# Patient Record
Sex: MALE | Race: WHITE | NOT HISPANIC OR LATINO | URBAN - METROPOLITAN AREA
[De-identification: names, ages, dates, MRNs, and addresses within clinical notes are randomized per-mention and may not be internally consistent; named-entity substitution may affect disease eponyms.]

---

## 2018-01-30 ENCOUNTER — HOME INFUSION (PRE-WILLOW HOME INFUSION) (OUTPATIENT)
Dept: PHARMACY | Facility: CLINIC | Age: 53
End: 2018-01-30

## 2018-01-31 NOTE — PROGRESS NOTES
This is a recent snapshot of the patient's [facility] medical record.  For current drug dose and complete information and questions, call [phone] or In Basket pool, [facility] home infusion ([id number])  CSN Number:  [id number]

## 2018-07-04 DIAGNOSIS — Z13.6 SCREENING FOR CARDIOVASCULAR CONDITION: Primary | ICD-10-CM

## 2018-07-12 ASSESSMENT — ENCOUNTER SYMPTOMS
MUSCLE WEAKNESS: 0
MUSCLE CRAMPS: 0
BACK PAIN: 0
NECK PAIN: 0
MYALGIAS: 0
STIFFNESS: 0
JOINT SWELLING: 0
ARTHRALGIAS: 1

## 2018-07-13 ENCOUNTER — OFFICE VISIT (OUTPATIENT)
Dept: CARDIOLOGY | Facility: CLINIC | Age: 53
End: 2018-07-13
Payer: COMMERCIAL

## 2018-07-13 VITALS
HEART RATE: 77 BPM | BODY MASS INDEX: 33.72 KG/M2 | DIASTOLIC BLOOD PRESSURE: 86 MMHG | WEIGHT: 249 LBS | SYSTOLIC BLOOD PRESSURE: 131 MMHG | HEIGHT: 72 IN | OXYGEN SATURATION: 96 %

## 2018-07-13 DIAGNOSIS — Z82.49 FAMILY HISTORY OF ISCHEMIC HEART DISEASE: ICD-10-CM

## 2018-07-13 DIAGNOSIS — I10 ESSENTIAL HYPERTENSION: ICD-10-CM

## 2018-07-13 DIAGNOSIS — R73.09 ELEVATED GLUCOSE: Primary | ICD-10-CM

## 2018-07-13 DIAGNOSIS — E78.6 LOW HDL (UNDER 40): ICD-10-CM

## 2018-07-13 DIAGNOSIS — Z13.6 SCREENING FOR CARDIOVASCULAR CONDITION: ICD-10-CM

## 2018-07-13 DIAGNOSIS — M16.0 ARTHRITIS OF BOTH HIPS: ICD-10-CM

## 2018-07-13 DIAGNOSIS — E78.1 HYPERTRIGLYCERIDEMIA: ICD-10-CM

## 2018-07-13 DIAGNOSIS — R73.09 ELEVATED GLUCOSE: ICD-10-CM

## 2018-07-13 LAB
CHOLEST SERPL-MCNC: 169 MG/DL
CREAT UR-MCNC: 168 MG/DL
CRP SERPL HS-MCNC: 6.2 MG/L
FEF 25/75: NORMAL
FEV-1: NORMAL
FEV1/FVC: NORMAL
FVC: NORMAL
GLUCOSE SERPL-MCNC: 129 MG/DL (ref 70–99)
HBA1C MFR BLD: 6.2 % (ref 0–5.6)
HDLC SERPL-MCNC: 25 MG/DL
INTERPRETATION ECG - MUSE: NORMAL
LDLC SERPL CALC-MCNC: 84 MG/DL
MICROALBUMIN UR-MCNC: 5 MG/L
MICROALBUMIN/CREAT UR: 3.01 MG/G CR (ref 0–17)
NONHDLC SERPL-MCNC: 144 MG/DL
NT-PROBNP SERPL-MCNC: 9 PG/ML (ref 0–125)
TRIGL SERPL-MCNC: 304 MG/DL

## 2018-07-13 RX ORDER — LISINOPRIL AND HYDROCHLOROTHIAZIDE 20; 25 MG/1; MG/1
1 TABLET ORAL DAILY
COMMUNITY
Start: 2018-05-29 | End: 2023-12-05

## 2018-07-13 RX ORDER — IBUPROFEN 200 MG
400 TABLET ORAL PRN
COMMUNITY
End: 2023-12-05

## 2018-07-13 RX ORDER — MAGNESIUM CITRATE
1 POWDER (GRAM) MISCELLANEOUS PRN
COMMUNITY
End: 2023-12-05

## 2018-07-13 NOTE — PROGRESS NOTES
" [facility] Cardiovascular Disease Prevention - Exam Note    Active Problems   Patient Active Problem List    Diagnosis Date Noted     Elevated glucose      Priority: Medium     Hypertriglyceridemia      Priority: Medium     Low HDL (under 40)      Priority: Medium     Arthritis of both hips      Priority: Medium     Hypertension 2010     Priority: Medium       Reason For Visit   Patient here for [facility] early detection of atherosclerosis and CVD exam.    Pain Evaluation  Current history of pain associated with this visit is: denied    HPI   Willam Duff is a 52 year old year old male with a history of hypertriglyceridemia, low HDL, hypertension and arthritis of this hips. He had chest pain last May and had an NMPI which was normal. He is concerned about his CV risk since his mother  of an MI abruptly at 62 and his father and brother have coronary disease.  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  His father and brother are also pre-diabetic and he has also had elevated glucose levels.  He has had no recurrent chest pain since May. He does not check his home BP. His left hip was replaced in February and he is planning to have the right one replaced in November.    Nutrition assessment per patient report:   We discussed \"my plate\" and Mediterranean eating patterns and literature was provided. We discussed nutrition for those with metabolic syndrome with more healthy fats, less carbs and partnering cabs with proteins and calorie reduction through smaller portions.  Foods with fat/cholesterol (fried foods, fatty meats, junk food):  0-2 per day   Fruits and vegetables (  cup cooked, 1 cup raw):  2 servings per day  Caffeine (1 cup coffee, soda, etc):  3 servings per day, coffee or energy drink.  Alcohol servings (12 oz. beer, 4 oz. wine, 1  oz. in mixed drink):  2-3 beer per week  Calcium servings (dairy foods, 8 oz. milk, yogurt, cheese, ice cream):  2 servings " per day  Salt/sodium use:  light use  Special dietary habits:  None    Activity  Patient is active 5-7 times per week for 30 or more minutes with walking, yardwork, house chores and walking his dog. Adding upper body strength exercise recommended. He has been limited to more exercise with recent hip replacement in February.    Laboratory Results Review  We discussed laboratory results today including lipids targets and how foods influence cholesterol.    Weight  His perceived healthy weight is 190 pounds.  A normal BMI of 25 is equal to 184 pounds.  The current BMI of 33.84 is high-risk range.  A weight reduction speed of 2-3 lbs per month for men is recommended.    PMH   Past Medical History:   Diagnosis Date     Arthritis of both hips      Diverticulitis      Elevated glucose      Hypertension      Hypertriglyceridemia      Low HDL (under 40)        PSH  Past Surgical History:   Procedure Laterality Date     C TOTAL HIP ARTHROPLASTY Left 2018     COLONOSCOPY  2015     SURGICAL PATHOLOGY EXAM         Current Meds   Current Outpatient Prescriptions   Medication Sig Dispense Refill     lisinopril-hydrochlorothiazide (PRINZIDE/ZESTORETIC) 20-25 MG per tablet Take 1 tablet by mouth daily       ibuprofen (ADVIL/MOTRIN) 200 MG tablet Take 400 mg by mouth as needed       Magnesium Citrate POWD Take 1 teaspoonful by mouth as needed         Allergies    No Known Allergies    Family Hx   Family History   Problem Relation Age of Onset     Coronary Artery Disease Mother 62     MI  first MI     Coronary Artery Disease Father 60     stents     Cerebrovascular Disease Father 70     small strokes. TIA     Pre-Diabetes Brother      Coronary Artery Disease Brother 57     stents     Other - See Comments Maternal Grandmother      unknown cause of death     Other - See Comments Maternal Grandfather      unknown cause of death     Other - See Comments Paternal Grandmother      unknown cause of death     No Known Problems  Brother      Other - See Comments Paternal Grandfather      unknown cause of death       Social History  Willam is  and  and is working full time. His job is semi-active.  He is  with 3 daughters 23, 22 and 18..     Enjoyment of life is 8 with 10 enjoys life fully.    Tobacco History  History   Smoking Status     Not on file   Smokeless Tobacco     Not on file       ROS  CONSTITUTIONAL:  No fever, chills, or sweats. No weight gain/loss.   EENT:  No visual disturbance, ear ache, epistaxis, sore throat  ALLERGIES/IMMUNOLOGIC:  Negative  RESPIRATORY:  No cough, hemoptysis  CARDIOVASCULAR:  As per HPI  GI:  No nausea, vomiting, hematemesis, melena  :  No urinary frequency, dysuria, or hematuria  INTEGUMENT:  Negative  PSYCHIATRIC:  Negative  NEURO:  Negative  ENDOCRINE:  Negative  MUSCULOSKELETAL:  Negative  MUSCULOSKELETAL:  hip pain related to arthritis     Vital Signs   /86  Pulse 77  Ht 1.829 m (6')  Wt 112.9 kg (249 lb)  SpO2 96%  BMI 33.77 kg/m2      Waist: 44 inches  Hip: 41 inches    Physical Exam   In general, the patient is a pleasant male in no apparent distress.    HEENT: NC/AT.  PERRLA.  EOMI.  Sclerae white, not injected.  Nares clear.  Pharynx without erythema or exudate.  Dentition intact.    Neck: No adenopathy.  No thyromegaly.Carotids +4/4 bilaterally without bruits.  No jugular venous distension.   Lungs: CTA.  No ronchi, wheezes, rales.     Cor: RRR. Normal S1, S2 splits physiologically. No murmur, rub, click, or gallop. The PMI is in the 5th ICS in the midclavicular line. There is no heave.   Abdomen: Soft, nontender, nondistended. No organomegaly.  No bruits.   Extremities: No clubbing, cyanosis, or edema. The pulses are +2/2 at the post-tibial sites bilaterally. No bruits are noted.    Recent Labs  Lab Results   Component Value Date     (H) 07/13/2018      Lab Results   Component Value Date    NTBNP 9 07/13/2018     No results found  for: NTBNPI   Lab Results   Component Value Date    UCRR 168 07/13/2018      Lab Results   Component Value Date    MICROL 5 07/13/2018      No results found for: MICROALBUMIN   No results found for: CRP   Lab Results   Component Value Date    CHOL 169 07/13/2018      Lab Results   Component Value Date    TRIG 304 (H) 07/13/2018      Lab Results   Component Value Date    HDL 25 (L) 07/13/2018      Lab Results   Component Value Date    LDL 84 07/13/2018      No results found for: VLDL   No results found for: CHOLHDLRATIO  Lab Results   Component Value Date    NHDL 144 (H) 07/13/2018          Ang Test Results    BASIC SPIROMETRY: Summary of two attempts (see printout for details of results)  Results Estimated range for ht/age   FVC: 4.79 liter FVC: 4.33-6.31 liter   FEV1: 4.05 liter FEV1: 3.26-4.93 liter     History of asthma:  NO   History of respiratory infection current/recent:  NO     Spirometry Results:  normal      WALKING BLOOD PRESSURE RESPONSE (3 minute, 5 MET level walk)   Pre BP: 140/80 mmHg  3 min BP: 176/60 mmHg  1 min post BP: 132/70 mmHg    Pre HR: 83 bpm  3 min HR: 115 bpm  1 min post HR: 77 bpm       RETINAL VASCULAR ASSESSMENT   Left Eye Abnormality:  none  AV Ratio: 0.8    Right Eye Abnormality:  none  AV Ratio: 0.8     Retinal Assessment:  normal      ABDOMINAL AORTA ULTRASOUND (< 2.5 normal, borderline 2.5-2.9, abnormal > 3)   SupraIliac 1.96 cm    SupraRenal 1.84 cm    InfraRenal Proximal 1.89 cm    InfraRenal Distal 2.17 cm      Abdominal Aorta Assessment:  normal      LEFT VENTRICULAR ULTRASOUND MEASUREMENTS (adjusted for BSA)  LVIDD 51.2 mm   Septa 10.4 mm   Posterior 9.3 mm     Left Ventricular US Assessment:  normal      Carotid Artery IMT measurements report and plaques in the small area examined:   Left IMT 0.724 mm  Plaques none    Right IMT 0.641 mm  Plaques none       ECG (see tracing):  normal sinus rhythm;  rate: 70 bpm      Arterial Elasticity per age and gender (see printout):    C1 13.6 mL/mmHg x 10  normal   C2 11.8 mL/mmHg x 100 normal   Supine blood pressure: 153/86 mmHg         [facility]    [address], [address], [place] [address]    Main: [phone]  [email or web address]                                                        Myocardial Perfusion Report                      Rest/Stress 1 Day Single Isotope Gated SPECT imaging with Sesar exercise     stress            DILLON BHAKTA ID: [id number] Age: 52 : 1965 Nuclear Tech: CPT    Exam Date: 05/10/2018 12:29 Gender: M RN/Ex. Physiologist: ZAIN/SALINAS    Accession #: [id number] Height: 72 in BSA: 2.3 m  Monitoring Provider: MARBIN SANCHEZ    Weight: 240 lbs BMI: 32.5 kg/m  Ordering Provider: JANIS STORM    Site: [facility]    Location: Outpatient    Indication: Chest pain, unspecified type        FINAL CONCLUSIONS    NORMAL exercise stress perfusion imaging study.    The stress and rest perfusion images are NORMAL.    Normal left ventricular size and systolic function with a calculated LVEF of 76%.    Normal left ventricular wall motion.    No diagnostic ECG evidence of ischemia.    Excellent functional METs.  Answers for HPI/ROS submitted by the patient on 2018   General Symptoms: No  Skin Symptoms: No  HENT Symptoms: No  EYE SYMPTOMS: No  HEART SYMPTOMS: No  LUNG SYMPTOMS: No  INTESTINAL SYMPTOMS: No  URINARY SYMPTOMS: No  REPRODUCTIVE SYMPTOMS: No  SKELETAL SYMPTOMS: Yes  BLOOD SYMPTOMS: No  NERVOUS SYSTEM SYMPTOMS: No  MENTAL HEALTH SYMPTOMS: No  Back pain: No  Muscle aches: No  Neck pain: No  Swollen joints: No  Joint pain: Yes  Bone pain: No  Muscle cramps: No  Muscle weakness: No  Joint stiffness: No  Bone fracture: No    Assessment:     Cardiovascular:  ECG sinus rhythm rate 73. Nt pro BNP is normal. No recurrent chest pain and NMPI was normal . He does have family hx of CAD with mother, father and brother 50-60's. We  discussed also obtaining a cac scan to see if statin therapy is needed with LDL at target but HDL low.    Blood Pressure:  Taking Lisinopril HC 20-25mg per day. 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 Resting blood pressures are hypertensive stage 1-2 today. Will consider changing HCTZ to amlodipine to reduce risk of diabetes, gout and frequency of urination. Creatinine elevated off and on up to 1.42 in 2015 most recent 1.22 5-2018. GFR as low as 53 but most often 58-60. He uses IBF with hip surgeries. Recommend that he is aware of good hydration and avoid taking more IBF than he needs.    Lipids: He is not taking any lipid medications. LDL has not been significantly elevated in the past and is currently at 84. His HDL is low at 25 and this is consistent with previous levels. Triglycerides have been in the 300's. Will initiate fish oil one tablet per day for triglyceride reduction. Depending on results he may need a statin if significant disease identified. Statin may increase his glucose further. Will obtain base line A1c to see if he meets criteria for diabetes. Metformin may be recommended depending on results.    Health Habit Summary:  Nutrition: Heart Healthy Eating:  some of the time   Exercise:  regularly active  Weight:  high-risk range  Tobacco Use:  never used    Full report to follow prevention team review of test results with scanned final report.    Time spent for patient visit was 60 minutes with more than half the time spent on counseling and coordination of care.    LADONNA Lee CNP       CC  Patient Care Team:  Look-Coty Livingston MD as PCP - General  SELF, REFERRED

## 2018-07-13 NOTE — LETTER
"2018      RE: Willam Duff  [address] [facility] [address]       Dear Colleague,    Thank you for the opportunity to participate in the care of your patient, Willam Duff, at the [facility] PREVENT[facility] at [facility]. Please see a copy of my visit note below.     [facility] Cardiovascular Disease Prevention - Exam Note    Active Problems   Patient Active Problem List    Diagnosis Date Noted     Elevated glucose      Priority: Medium     Hypertriglyceridemia      Priority: Medium     Low HDL (under 40)      Priority: Medium     Arthritis of both hips      Priority: Medium     Hypertension 2010     Priority: Medium       Reason For Visit   Patient here for [facility] early detection of atherosclerosis and CVD exam.    Pain Evaluation  Current history of pain associated with this visit is: denied    HPI   Willam Duff is a 52 year old year old male with a history of hypertriglyceridemia, low HDL, hypertension and arthritis of this hips. He had chest pain last May and had an NMPI which was normal. He is concerned about his CV risk since his mother  of an MI abruptly at 62 and his father and brother have coronary disease.  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  His father and brother are also pre-diabetic and he has also had elevated glucose levels.  He has had no recurrent chest pain since May. He does not check his home BP. His left hip was replaced in February and he is planning to have the right one replaced in November.    Nutrition assessment per patient report:   We discussed \"my plate\" and Mediterranean eating patterns and literature was provided. We discussed nutrition for those with metabolic syndrome with more healthy fats, less carbs and partnering cabs with proteins and calorie reduction through smaller portions.  Foods with fat/cholesterol (fried foods, " fatty meats, junk food):  0-2 per day   Fruits and vegetables (  cup cooked, 1 cup raw):  2 servings per day  Caffeine (1 cup coffee, soda, etc):  3 servings per day, coffee or energy drink.  Alcohol servings (12 oz. beer, 4 oz. wine, 1  oz. in mixed drink):  2-3 beer per week  Calcium servings (dairy foods, 8 oz. milk, yogurt, cheese, ice cream):  2 servings per day  Salt/sodium use:  light use  Special dietary habits:  None    Activity  Patient is active 5-7 times per week for 30 or more minutes with walking, yardwork, house chores and walking his dog. Adding upper body strength exercise recommended. He has been limited to more exercise with recent hip replacement in February.    Laboratory Results Review  We discussed laboratory results today including lipids targets and how foods influence cholesterol.    Weight  His perceived healthy weight is 190 pounds.  A normal BMI of 25 is equal to 184 pounds.  The current BMI of 33.84 is high-risk range.  A weight reduction speed of 2-3 lbs per month for men is recommended.    PMH   Past Medical History:   Diagnosis Date     Arthritis of both hips      Diverticulitis      Elevated glucose      Hypertension 2010     Hypertriglyceridemia      Low HDL (under 40)        PSH  Past Surgical History:   Procedure Laterality Date     C TOTAL HIP ARTHROPLASTY Left 2018     COLONOSCOPY  2015     SURGICAL PATHOLOGY EXAM         Current Meds   Current Outpatient Prescriptions   Medication Sig Dispense Refill     lisinopril-hydrochlorothiazide (PRINZIDE/ZESTORETIC) 20-25 MG per tablet Take 1 tablet by mouth daily       ibuprofen (ADVIL/MOTRIN) 200 MG tablet Take 400 mg by mouth as needed       Magnesium Citrate POWD Take 1 teaspoonful by mouth as needed         Allergies    No Known Allergies    Family Hx   Family History   Problem Relation Age of Onset     Coronary Artery Disease Mother 62     MI  first MI     Coronary Artery Disease Father 60     stents     Cerebrovascular  Disease Father 70     small strokes. TIA     Pre-Diabetes Brother      Coronary Artery Disease Brother 57     stents     Other - See Comments Maternal Grandmother      unknown cause of death     Other - See Comments Maternal Grandfather      unknown cause of death     Other - See Comments Paternal Grandmother      unknown cause of death     No Known Problems Brother      Other - See Comments Paternal Grandfather      unknown cause of death       Social History  Willam is  and  and is working full time. His job is semi-active.  He is  with 3 daughters 23, 22 and 18..     Enjoyment of life is 8 with 10 enjoys life fully.    Tobacco History  History   Smoking Status     Not on file   Smokeless Tobacco     Not on file       ROS  CONSTITUTIONAL:  No fever, chills, or sweats. No weight gain/loss.   EENT:  No visual disturbance, ear ache, epistaxis, sore throat  ALLERGIES/IMMUNOLOGIC:  Negative  RESPIRATORY:  No cough, hemoptysis  CARDIOVASCULAR:  As per HPI  GI:  No nausea, vomiting, hematemesis, melena  :  No urinary frequency, dysuria, or hematuria  INTEGUMENT:  Negative  PSYCHIATRIC:  Negative  NEURO:  Negative  ENDOCRINE:  Negative  MUSCULOSKELETAL:  Negative  MUSCULOSKELETAL:  hip pain related to arthritis     Vital Signs   /86  Pulse 77  Ht 1.829 m (6')  Wt 112.9 kg (249 lb)  SpO2 96%  BMI 33.77 kg/m2      Waist: 44 inches  Hip: 41 inches    Physical Exam   In general, the patient is a pleasant male in no apparent distress.    HEENT: NC/AT.  PERRLA.  EOMI.  Sclerae white, not injected.  Nares clear.  Pharynx without erythema or exudate.  Dentition intact.    Neck: No adenopathy.  No thyromegaly.Carotids +4/4 bilaterally without bruits.  No jugular venous distension.   Lungs: CTA.  No ronchi, wheezes, rales.     Cor: RRR. Normal S1, S2 splits physiologically. No murmur, rub, click, or gallop. The PMI is in the 5th ICS in the midclavicular line. There is no  chris.   Abdomen: Soft, nontender, nondistended. No organomegaly.  No bruits.   Extremities: No clubbing, cyanosis, or edema. The pulses are +2/2 at the post-tibial sites bilaterally. No bruits are noted.    Recent Labs  Lab Results   Component Value Date     (H) 07/13/2018      Lab Results   Component Value Date    NTBNP 9 07/13/2018     No results found for: NTBNPI   Lab Results   Component Value Date    UCRR 168 07/13/2018      Lab Results   Component Value Date    MICROL 5 07/13/2018      No results found for: MICROALBUMIN   No results found for: CRP   Lab Results   Component Value Date    CHOL 169 07/13/2018      Lab Results   Component Value Date    TRIG 304 (H) 07/13/2018      Lab Results   Component Value Date    HDL 25 (L) 07/13/2018      Lab Results   Component Value Date    LDL 84 07/13/2018      No results found for: VLDL   No results found for: CHOLHDLRATIO  Lab Results   Component Value Date    NHDL 144 (H) 07/13/2018          Ang Test Results    BASIC SPIROMETRY: Summary of two attempts (see printout for details of results)  Results Estimated range for ht/age   FVC: 4.79 liter FVC: 4.33-6.31 liter   FEV1: 4.05 liter FEV1: 3.26-4.93 liter     History of asthma:  NO   History of respiratory infection current/recent:  NO     Spirometry Results:  normal      WALKING BLOOD PRESSURE RESPONSE (3 minute, 5 MET level walk)   Pre BP: 140/80 mmHg  3 min BP: 176/60 mmHg  1 min post BP: 132/70 mmHg    Pre HR: 83 bpm  3 min HR: 115 bpm  1 min post HR: 77 bpm       RETINAL VASCULAR ASSESSMENT   Left Eye Abnormality:  none  AV Ratio: 0.8    Right Eye Abnormality:  none  AV Ratio: 0.8     Retinal Assessment:  normal      ABDOMINAL AORTA ULTRASOUND (< 2.5 normal, borderline 2.5-2.9, abnormal > 3)   SupraIliac 1.96 cm    SupraRenal 1.84 cm    InfraRenal Proximal 1.89 cm    InfraRenal Distal 2.17 cm      Abdominal Aorta Assessment:  normal      LEFT VENTRICULAR ULTRASOUND MEASUREMENTS (adjusted for  BSA)  LVIDD 51.2 mm   Septa 10.4 mm   Posterior 9.3 mm     Left Ventricular US Assessment:  normal      Carotid Artery IMT measurements report and plaques in the small area examined:   Left IMT 0.724 mm  Plaques none    Right IMT 0.641 mm  Plaques none       ECG (see tracing):  normal sinus rhythm;  rate: 70 bpm      Arterial Elasticity per age and gender (see printout):   C1 13.6 mL/mmHg x 10  normal   C2 11.8 mL/mmHg x 100 normal   Supine blood pressure: 153/86 mmHg         [facility]    [address], [address], [place] [address]    Main: [phone]  [email or web address]                                                        Myocardial Perfusion Report                      Rest/Stress 1 Day Single Isotope Gated SPECT imaging with Sesar exercise     stress            DILLON BHAKTA ID: [id number] Age: 52 : 1965 Nuclear Tech: CPT    Exam Date: 05/10/2018 12:29 Gender: M RN/Ex. Physiologist: ZAIN/SALINAS    Accession #: [id number] Height: 72 in BSA: 2.3 m  Monitoring Provider: MARBIN SANCHEZ    Weight: 240 lbs BMI: 32.5 kg/m  Ordering Provider: JANIS STORM    Site: [facility]    Location: Outpatient    Indication: Chest pain, unspecified type        FINAL CONCLUSIONS    NORMAL exercise stress perfusion imaging study.    The stress and rest perfusion images are NORMAL.    Normal left ventricular size and systolic function with a calculated LVEF of 76%.    Normal left ventricular wall motion.    No diagnostic ECG evidence of ischemia.    Excellent functional METs.  Answers for HPI/ROS submitted by the patient on 2018   General Symptoms: No  Skin Symptoms: No  HENT Symptoms: No  EYE SYMPTOMS: No  HEART SYMPTOMS: No  LUNG SYMPTOMS: No  INTESTINAL SYMPTOMS: No  URINARY SYMPTOMS: No  REPRODUCTIVE SYMPTOMS: No  SKELETAL SYMPTOMS: Yes  BLOOD SYMPTOMS: No  NERVOUS SYSTEM SYMPTOMS: No  MENTAL HEALTH SYMPTOMS: No  Back pain: No  Muscle  aches: No  Neck pain: No  Swollen joints: No  Joint pain: Yes  Bone pain: No  Muscle cramps: No  Muscle weakness: No  Joint stiffness: No  Bone fracture: No    Assessment:     Cardiovascular:  ECG sinus rhythm rate 73. Nt pro BNP is normal. No recurrent chest pain and NMPI was normal 5-2018. He does have family hx of CAD with mother, father and brother 50-60's. We discussed also obtaining a cac scan to see if statin therapy is needed with LDL at target but HDL low.    Blood Pressure:  Taking Lisinopril HC 20-25mg per day. 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 Resting blood pressures are hypertensive stage 1-2 today. Will consider changing HCTZ to amlodipine to reduce risk of diabetes, gout and frequency of urination. Creatinine elevated off and on up to 1.42 in 2015 most recent 1.22 5-2018. GFR as low as 53 but most often 58-60. He uses IBF with hip surgeries. Recommend that he is aware of good hydration and avoid taking more IBF than he needs.    Lipids: He is not taking any lipid medications. LDL has not been significantly elevated in the past and is currently at 84. His HDL is low at 25 and this is consistent with previous levels. Triglycerides have been in the 300's. Will initiate fish oil one tablet per day for triglyceride reduction. Depending on results he may need a statin if significant disease identified. Statin may increase his glucose further. Will obtain base line A1c to see if he meets criteria for diabetes. Metformin may be recommended depending on results.    Health Habit Summary:  Nutrition: Heart Healthy Eating:  some of the time   Exercise:  regularly active  Weight:  high-risk range  Tobacco Use:  never used    Full report to follow prevention team review of test results with scanned final report.    Time spent for patient visit was 60 minutes with more than half the time spent on counseling and coordination of care.    Daniela Quinonez  LADONNA Mendez CNP       CC  Patient Care Team:  Look-Coty Livingston MD as PCP - General  SELF, REFERRED    Please do not hesitate to contact me if you have any questions/concerns.     Sincerely,     LADONNA Lee CNP

## 2018-07-19 ENCOUNTER — TELEPHONE (OUTPATIENT)
Dept: CARDIOLOGY | Facility: CLINIC | Age: 53
End: 2018-07-19

## 2018-07-19 RX ORDER — AMLODIPINE BESYLATE 5 MG/1
5 TABLET ORAL DAILY
Qty: 90 TABLET | Refills: 0 | Status: SHIPPED | OUTPATIENT
Start: 2018-07-19 | End: 2018-10-16

## 2018-07-19 NOTE — TELEPHONE ENCOUNTER
Will add amlodipine 5 mg pre day to optimizes his blood pressure. He will check home BP's and call me in a few weeks with results. Will recheck glucose and A1c in 3 months. He is having CAC scan next week with strong family hx of early CAD. He has purchased fish oil and will start taking one per day.

## 2018-07-23 ENCOUNTER — HOSPITAL ENCOUNTER (OUTPATIENT)
Dept: CT IMAGING | Facility: CLINIC | Age: 53
Discharge: HOME | End: 2018-07-23
Attending: NURSE PRACTITIONER | Admitting: NURSE PRACTITIONER
Payer: COMMERCIAL

## 2018-07-23 DIAGNOSIS — I10 ESSENTIAL HYPERTENSION: ICD-10-CM

## 2018-07-23 DIAGNOSIS — R73.09 ELEVATED GLUCOSE: ICD-10-CM

## 2018-07-23 DIAGNOSIS — E78.1 HYPERTRIGLYCERIDEMIA: ICD-10-CM

## 2018-07-23 DIAGNOSIS — Z82.49 FAMILY HISTORY OF ISCHEMIC HEART DISEASE: ICD-10-CM

## 2018-07-23 DIAGNOSIS — M16.0 ARTHRITIS OF BOTH HIPS: ICD-10-CM

## 2018-07-23 DIAGNOSIS — E78.6 LOW HDL (UNDER 40): ICD-10-CM

## 2018-07-23 PROCEDURE — 75571 CT HRT W/O DYE W/CA TEST: CPT

## 2018-07-23 PROCEDURE — 75571 CT HRT W/O DYE W/CA TEST: CPT | Mod: 26 | Performed by: INTERNAL MEDICINE

## 2018-10-16 DIAGNOSIS — Z82.49 FAMILY HISTORY OF ISCHEMIC HEART DISEASE: ICD-10-CM

## 2018-10-16 DIAGNOSIS — R73.09 ELEVATED GLUCOSE: ICD-10-CM

## 2018-10-16 DIAGNOSIS — E78.1 HYPERTRIGLYCERIDEMIA: ICD-10-CM

## 2018-10-16 DIAGNOSIS — M16.0 ARTHRITIS OF BOTH HIPS: ICD-10-CM

## 2018-10-16 DIAGNOSIS — I10 ESSENTIAL HYPERTENSION: ICD-10-CM

## 2018-10-16 DIAGNOSIS — E78.6 LOW HDL (UNDER 40): ICD-10-CM

## 2018-10-16 RX ORDER — AMLODIPINE BESYLATE 5 MG/1
5 TABLET ORAL DAILY
Qty: 90 TABLET | Refills: 0 | Status: SHIPPED | OUTPATIENT
Start: 2018-10-16 | End: 2019-01-22

## 2018-10-16 RX ORDER — AMLODIPINE BESYLATE 5 MG/1
5 TABLET ORAL DAILY
Qty: 90 TABLET | Refills: 0 | Status: CANCELLED | OUTPATIENT
Start: 2018-10-16

## 2019-01-15 DIAGNOSIS — R73.09 ELEVATED GLUCOSE: ICD-10-CM

## 2019-01-15 DIAGNOSIS — Z82.49 FAMILY HISTORY OF ISCHEMIC HEART DISEASE: ICD-10-CM

## 2019-01-15 DIAGNOSIS — E78.6 LOW HDL (UNDER 40): ICD-10-CM

## 2019-01-15 DIAGNOSIS — M16.0 ARTHRITIS OF BOTH HIPS: ICD-10-CM

## 2019-01-15 DIAGNOSIS — E78.1 HYPERTRIGLYCERIDEMIA: ICD-10-CM

## 2019-01-15 DIAGNOSIS — I10 ESSENTIAL HYPERTENSION: ICD-10-CM

## 2019-01-22 ENCOUNTER — TELEPHONE (OUTPATIENT)
Dept: CARDIOLOGY | Facility: CLINIC | Age: 54
End: 2019-01-22

## 2019-01-22 DIAGNOSIS — Z82.49 FAMILY HISTORY OF ISCHEMIC HEART DISEASE: ICD-10-CM

## 2019-01-22 DIAGNOSIS — E78.1 HYPERTRIGLYCERIDEMIA: ICD-10-CM

## 2019-01-22 DIAGNOSIS — R73.09 ELEVATED GLUCOSE: ICD-10-CM

## 2019-01-22 DIAGNOSIS — M16.0 ARTHRITIS OF BOTH HIPS: ICD-10-CM

## 2019-01-22 DIAGNOSIS — I10 ESSENTIAL HYPERTENSION: ICD-10-CM

## 2019-01-22 DIAGNOSIS — E78.6 LOW HDL (UNDER 40): ICD-10-CM

## 2019-01-22 RX ORDER — AMLODIPINE BESYLATE 5 MG/1
5 TABLET ORAL DAILY
Qty: 90 TABLET | Refills: 0 | Status: SHIPPED | OUTPATIENT
Start: 2019-01-22 | End: 2023-12-05

## 2019-01-22 RX ORDER — AMLODIPINE BESYLATE 5 MG/1
5 TABLET ORAL DAILY
Qty: 90 TABLET | Refills: 0 | Status: CANCELLED | OUTPATIENT
Start: 2019-01-22

## 2020-02-24 ENCOUNTER — HEALTH MAINTENANCE LETTER (OUTPATIENT)
Age: 55
End: 2020-02-24

## 2020-12-13 ENCOUNTER — HEALTH MAINTENANCE LETTER (OUTPATIENT)
Age: 55
End: 2020-12-13

## 2021-04-17 ENCOUNTER — HEALTH MAINTENANCE LETTER (OUTPATIENT)
Age: 56
End: 2021-04-17

## 2021-09-26 ENCOUNTER — HEALTH MAINTENANCE LETTER (OUTPATIENT)
Age: 56
End: 2021-09-26

## 2022-05-08 ENCOUNTER — HEALTH MAINTENANCE LETTER (OUTPATIENT)
Age: 57
End: 2022-05-08

## 2023-04-23 ENCOUNTER — HEALTH MAINTENANCE LETTER (OUTPATIENT)
Age: 58
End: 2023-04-23

## 2023-06-02 ENCOUNTER — HEALTH MAINTENANCE LETTER (OUTPATIENT)
Age: 58
End: 2023-06-02

## 2023-11-14 ENCOUNTER — TRANSFERRED RECORDS (OUTPATIENT)
Dept: HEALTH INFORMATION MANAGEMENT | Facility: CLINIC | Age: 58
End: 2023-11-14
Payer: COMMERCIAL

## 2023-11-28 ENCOUNTER — TRANSCRIBE ORDERS (OUTPATIENT)
Dept: OTHER | Age: 58
End: 2023-11-28

## 2023-11-28 DIAGNOSIS — H90.5 SENSORINEURAL HEARING LOSS (SNHL) OF LEFT EAR: Primary | ICD-10-CM

## 2023-11-29 DIAGNOSIS — H90.5 SNHL (SENSORINEURAL HEARING LOSS): Primary | ICD-10-CM

## 2023-11-29 DIAGNOSIS — I65.1 VERTEBROBASILAR DOLICHOECTASIA: ICD-10-CM

## 2023-11-30 NOTE — TELEPHONE ENCOUNTER
FUTURE VISIT INFORMATION      FUTURE VISIT INFORMATION:  Date: 12/5/23  Time: 2:30 PM  Location: [facility]  REFERRAL INFORMATION:  Referring provider:  Mirlande SURESH   Referring providers clinic:  [facility]   Reason for visit/diagnosis:  L Sensorineural hearing loss with MRI showing dolichoectasia of R vertebral artery in contact with the L 7th and 8th CNs - Referred by Mirlande SURESH. MRI done 11/29 at [facility]     RECORDS REQUESTED FROM      Clinic name Comments Records Status Imaging Status   [facility]  11/21/23 OV with Mirlande SURESH   11/14/23 OV with Lexy Euceda CCC-A      11/14/23 audiogram -- received, send to scan CE    PN imaging MR brain 11/27/23 CE Req 11/30/23  PACS           November 30, 2023 7:44 AM - Image and audiogram requested -[facility]  December 1, 2023 7:28 AM - Received 11/14/23 audiogram and send to scanning. Images resolved in PACS -[facility]

## 2023-12-03 NOTE — PROGRESS NOTES
[facility]  Department of Neurosurgery    Name: Willam Duff  MRN: [id number]  : 1965     Reason for visit: hearing loss and dolichoectactic vertebrobasilar ectasia, new patient visit    Dear Dr. Sorensen and Ms. Schmid,    It was a pleasure to see Mr. Wright in the [facility] today.  I have reviewed the referral notes and imaging and have summarized our visit here. As you recall, Mr. Wright is a pleasant 57 year-old right-handed male who presents with left sided hearing loss and tinnitus. After workup by Ms. Rogersjose m, an audiogram was performed showing asymmetric hearing loss and then an MRI was performed showing an ectatic vertebrobasilar system with compression of the cranial nerve 7/8 complex. He is referred for management.    Review of Systems:   Pertinent items are noted in HPI or as in patient entered ROS below, remainder of complete ROS is negative.     Medications: amlodipine, ibuprofen, lisinopril, hydrochlorothiazide, magnesium     Allergies: Patient has no known allergies.      Past Medical History:  arthritis, diverticulitis, hypertension, elevated cholesterol, hip arthroplasty    Family History:  mother and father side with coronary artery disease     Social History: lifetime nonsmoker. He is a  with noise exposure.    Physical Exam:   General: No acute distress.   Head: No signs of trauma.    Eyes: Conjunctivae are normal.  Mouth/Throat: Oropharynx moist.  Neck: Normal range of motion.    Resp: No respiratory distress.   MSK: Moves all extremities.  No obvious deformity.  Neuro: The patient is fully oriented and quite pleasant. Speech normal. Extraocular movements are intact without nystagmus. Facial sensation is intact in V1, V2, V3 distributions. Facial nerve function is normal, rated as a House Brackmann 1, without synkinesis.  Palate is symmetric. Shoulders are full strength.  "Tongue is midline. There is no pronator drift. Full strength in all extremities. Sensation intact throughout. No dysmetria with finger-nose-finger testing.   Psych: Normal mood and affect. Behavior is normal.      Audiogram:  We reviewed the audiogram today which shows:  Right PTA 8 dB,  % at 50 dB  Left PTA 29 dB, WRS 92 % at 80 dB    Audiogram 12/1/2023 at [facility] shows a downsloping hearing loss on the left.   Right PTA 7dB, % at 50dB  Left PTA 23dB, WRS 80% at 65dB    Imaging:  We reviewed the MRI from 11/27/2023 that shows a dolicoectactic vertebrobasilar system. There is a large right and diminutive left vetebral artery. The large right vertebral artery courses contralaterally and produces mass effect on the cranial CN7/8 complex and brainstem. It is unclear if the vertebral arteries join or maintain distinct systems. I do not see any enhancing pathology in the IAC or CPA.     Assessment:  Left hearing loss, tinnitus, class A  2.   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 Vertebrobasilar compression as a cause of ipsilateral hearing loss and vestibular dysfunction is a rarer but reported symptom due to mass effect on the 7/8 complex than the more common hemifacial spasm. We also consider this a \"a diagnosis of exclusion\" with an importance of ruling out other causes of asymmetric hearing loss. In his case, it is also possible that noise exposure from his occupation/firearms may be the cause and it is difficult to know for sure if the vessel compression is the definitive cause.  Vessel ectasia is often linked with hypertension, smoking and uncommon vertebral-basilar anatomy.  We advised good blood pressure control as an attempt to further worsen the ectasia and effect on the CN 7/8 complex.   We advised good blood pressure control, not smoking (he is a nonsmoker), and cholesterol control, " diet and exercise optimization. He is working with his PCP on this  We will plan to see him as needed and we encouraged him to call us with any questions moving forward.      It was a pleasure to participate in the care of your patient. Please feel free to contact me at any point if I can be of any assistance for Mr. Duff.    Sincerely,  Seferino Lewis MD       30 minutes spent on the date of the encounter doing chart review, review of outside records, review of test results, interpretation of tests, patient visit, documentation and discussion with other provider(s)

## 2023-12-05 ENCOUNTER — PRE VISIT (OUTPATIENT)
Dept: OTOLARYNGOLOGY | Facility: CLINIC | Age: 58
End: 2023-12-05

## 2023-12-05 ENCOUNTER — OFFICE VISIT (OUTPATIENT)
Dept: OTOLARYNGOLOGY | Facility: CLINIC | Age: 58
End: 2023-12-05
Payer: COMMERCIAL

## 2023-12-05 ENCOUNTER — OFFICE VISIT (OUTPATIENT)
Dept: AUDIOLOGY | Facility: CLINIC | Age: 58
End: 2023-12-05
Attending: OTOLARYNGOLOGY
Payer: COMMERCIAL

## 2023-12-05 DIAGNOSIS — I65.1 VERTEBROBASILAR DOLICHOECTASIA: Primary | ICD-10-CM

## 2023-12-05 DIAGNOSIS — I65.1 VERTEBROBASILAR DOLICHOECTASIA: ICD-10-CM

## 2023-12-05 DIAGNOSIS — H90.3 ASYMMETRICAL SENSORINEURAL HEARING LOSS: Primary | ICD-10-CM

## 2023-12-05 DIAGNOSIS — H90.3 ASYMMETRIC SNHL (SENSORINEURAL HEARING LOSS): Primary | ICD-10-CM

## 2023-12-05 DIAGNOSIS — H90.42 SENSORINEURAL HEARING LOSS (SNHL) OF LEFT EAR WITH UNRESTRICTED HEARING OF RIGHT EAR: ICD-10-CM

## 2023-12-05 DIAGNOSIS — H90.5 SNHL (SENSORINEURAL HEARING LOSS): ICD-10-CM

## 2023-12-05 DIAGNOSIS — I77.89 ECTASIA OF ARTERY (H): ICD-10-CM

## 2023-12-05 PROCEDURE — 92557 COMPREHENSIVE HEARING TEST: CPT | Mod: 52 | Performed by: AUDIOLOGIST

## 2023-12-05 PROCEDURE — 99203 OFFICE O/P NEW LOW 30 MIN: CPT | Performed by: NEUROLOGICAL SURGERY

## 2023-12-05 PROCEDURE — 92565 STENGER TEST PURE TONE: CPT | Performed by: AUDIOLOGIST

## 2023-12-05 PROCEDURE — 99203 OFFICE O/P NEW LOW 30 MIN: CPT | Mod: GC | Performed by: OTOLARYNGOLOGY

## 2023-12-05 PROCEDURE — 92550 TYMPANOMETRY & REFLEX THRESH: CPT | Performed by: AUDIOLOGIST

## 2023-12-05 RX ORDER — LISINOPRIL 40 MG/1
1 TABLET ORAL DAILY
COMMUNITY
Start: 2023-11-03

## 2023-12-05 RX ORDER — AMLODIPINE BESYLATE 10 MG/1
TABLET ORAL
COMMUNITY

## 2023-12-05 ASSESSMENT — PAIN SCALES - GENERAL: PAINLEVEL: NO PAIN (0)

## 2023-12-05 NOTE — NURSING NOTE
Chief Complaint   Patient presents with    Consult     Sensorineural hearing loss      Miller Ruiz LPN

## 2023-12-05 NOTE — PROGRESS NOTES
[facility]      Name: Willam Duff  MRN: [id number]  Age: 57 year old  : 1965  Referring provider:   Mirlande SURESH   2023     Chief Complaint:   Consultation    History of Present Illness:   Willam Duff is a 57 year old male with a history of asymmetrical hearing loss, left-sided SNHL who was seen in the [facility] for consultation requested by Mirlande SURESH regarding dolichoectasia of the right vertebral artery.  He is seen in conjunction with my colleague in neurosurgery, Dr. Seferino Lewis.    Mr. Duff was found to have left sensorineural hearing loss. MRI was done showing dolichoectasia of the right vertebral artery which is potentially in contact with left CNVII and CNVIII.     He reports changes in hearing over the past 2 years and constant tinnitus in the left ear. Tinnitus onset over 5 yeasrs ago.  The tinnitus is fluctuating and high-pitched in character.  It is not pulsatile.  Of note, he works as a  and has a history of noise exposure due to firearms.     He has noted no vertigo, imbalance, or asymmetry of facial movements.    Review of Systems:   Pertinent items are noted in HPI or as in patient entered ROS below, remainder of complete ROS is negative.       2023     5:38 PM    ENT ROS   Ears, Nose, Throat Hearing loss    Ringing/noise in ears        Active Medications:     Current Outpatient Medications:     amLODIPine (NORVASC) 10 MG tablet, , Disp: , Rfl:     lisinopril (ZESTRIL) 40 MG tablet, Take 1 tablet by mouth daily, Disp: , Rfl:       Allergies:   Hydrochlorothiazide      Past Medical History:  Past Medical History:   Diagnosis Date    Arthritis of both hips     Diverticulitis 2015    Elevated glucose     Hypertension 2010    Hypertriglyceridemia     Low HDL (under 40)     Normal cardiac stress test 2018    NMPI     Patient Active Problem List   Diagnosis     Elevated glucose    Hypertension    Hypertriglyceridemia    Low HDL (under 40)    Arthritis of both hips        Past Surgical History:  Past Surgical History:   Procedure Laterality Date    COLONOSCOPY      SURGICAL PATHOLOGY EXAM      ZZC TOTAL HIP ARTHROPLASTY Left 2018       Family History:   Family History   Problem Relation Age of Onset    Coronary Artery Disease Mother 62        MI  first MI    Coronary Artery Disease Father 60        stents    Cerebrovascular Disease Father 70        small strokes. TIA    Gout Father     Pre-Diabetes Brother     Coronary Artery Disease Brother 57        stents    Other - See Comments Maternal Grandmother         unknown cause of death    Other - See Comments Maternal Grandfather         unknown cause of death    Other - See Comments Paternal Grandmother         unknown cause of death    No Known Problems Brother     Other - See Comments Paternal Grandfather         unknown cause of death         Social History:   Social History     Tobacco Use    Smoking status: Never        Physical Exam:   Constitutional:  The patient was accompanied by his wife Raissa, well-groomed, and in no acute distress.     Skin: Normal:  warm and pink without rash   Neurologic: Alert and oriented x 3.  CN's III-XII within normal limits.  Voice normal.    Psychiatric: The patient's affect was calm, cooperative, and appropriate.     Communication:  Normal; communicates verbally, normal voice quality.   Respiratory: Breathing comfortably without stridor or exertion of accessory muscles.    Head/Face:  No lesions or scars. No sinus tenderness.    Salivary glands -  Normal size, no tenderness, swelling, or palpable masses   Eyes: Pupils were equal and reactive.  Extraocular movement intact.     Ears: Pinnae and tragus non-tender.    EAC's and TM's were clear.            Audiogram:  AUDIOGRAM: He underwent an audiogram 23. This demonstrated:      Right: Speech reception threshold is 15  dB with 100% word recognition.   Left: Speech reception threshold is 25 dB with 80% word recognition.    Audiogram was independently reviewed    Imaging:  MRI Brain 11/27/23  Impression:   1. Dolichoectasia as well as significant tortuosity of the right vertebral artery which courses into the left cerebellopontine angle cistern causing dorsal and inferior displacement of the left 7th and 8th cranial nerve complex within the cistern. Please see Key images. This could be potentially associated with pulsatile tinnitus as well as left seventh and/or eighth cranial neuropathy.   2. Punctate probable chronic microvascular ischemic foci involving the right frontal lobe subcortical white matter.   3. Mild cerebral and mild cerebellar volume loss.   4. No evidence for acute infarction, abnormal intracranial enhancement, intracranial mass, vestibular schwannoma, abnormal labyrinthine enhancement or mastoiditis.    MRI Brain images were independently reviewed.    Outside records from [facility] were independently reviewed.         Assessment and Plan:  Willam Duff is a 57 year old male with left sensorineural hearing loss who was found to have dolichoectasia of the vertebral artery abutting the 7th and 8th cranial nerves.  He has left hearing loss and tinnitus which may or may not be secondary to this finding. He has no signs or symptoms of facial nerve weakness, vestibular dysfunction, or hydrocephalus.    Claude and his wife asked excellent questions and we had a long discussion about dolichoectasia and asymmetric sensorineural hearing loss.  It is possible that his hearing loss is unrelated to the dolichoectasia and represents asymmetric noise exposure over the course of his life. There is no evidence of progressive retrocochlear pathologies on imaging.  He will be an excellent candidate for amplification with a hearing aid and he has an appointment scheduled with audiology.  We discussed the natural history  of dolichoectasia and that he should continue to take excellent care of cardiovascular risk factors.  We counseled him on reasons to return to clinic, specifically development of hemifacial pain or spasm.  There is no indication to serially image this finding in the absence of evolving symptoms.  Mr. Duff and his wife expressed understanding of these recommendations.    Follow-up: Return precautions discussed.    Luis Oviedo MD  Otolaryngology Head and Neck Surgery Resident, PGY-2    Jessica Hunter MD  Otology & Neurotology  [facility]    I, Jessica Hunter MD, saw this patient with the resident/fellow and agree with the resident's findings and plan of care as documented in the resident's/fellow's note.

## 2023-12-05 NOTE — LETTER
2023       RE: Willam Duff  [address] [place] [address]     Dear Colleague,    Thank you for referring your patient, Willam Duff, to the [facility] TH[facility]IC [place] [facility]. Please see a copy of my visit note below.    [facility]  Department of Neurosurgery    Name: Willam Duff  MRN: [id number]  : 1965     Reason for visit: hearing loss and dolichoectactic vertebrobasilar ectasia, new patient visit    Dear Dr. Sorensen and Ms. Schmid,    It was a pleasure to see Mr. Wright in the [facility] today.  I have reviewed the referral notes and imaging and have summarized our visit here. As you recall, Mr. Wright is a pleasant 57 year-old right-handed male who presents with left sided hearing loss and tinnitus. After workup by Ms. Schmid, an audiogram was performed showing asymmetric hearing loss and then an MRI was performed showing an ectatic vertebrobasilar system with compression of the cranial nerve 7/8 complex. He is referred for management.    Review of Systems:   Pertinent items are noted in HPI or as in patient entered ROS below, remainder of complete ROS is negative.     Medications: amlodipine, ibuprofen, lisinopril, hydrochlorothiazide, magnesium     Allergies: Patient has no known allergies.      Past Medical History:  arthritis, diverticulitis, hypertension, elevated cholesterol, hip arthroplasty    Family History:  mother and father side with coronary artery disease     Social History: lifetime nonsmoker. He is a  with noise exposure.    Physical Exam:   General: No acute distress.   Head: No signs of trauma.    Eyes: Conjunctivae are normal.  Mouth/Throat: Oropharynx moist.  Neck: Normal range of motion.    Resp: No respiratory distress.   MSK: Moves all extremities.   "No obvious deformity.  Neuro: The patient is fully oriented and quite pleasant. Speech normal. Extraocular movements are intact without nystagmus. Facial sensation is intact in V1, V2, V3 distributions. Facial nerve function is normal, rated as a House Brackmann 1, without synkinesis.  Palate is symmetric. Shoulders are full strength. Tongue is midline. There is no pronator drift. Full strength in all extremities. Sensation intact throughout. No dysmetria with finger-nose-finger testing.   Psych: Normal mood and affect. Behavior is normal.      Audiogram:  We reviewed the audiogram today which shows:  Right PTA 8 dB,  % at 50 dB  Left PTA 29 dB, WRS 92 % at 80 dB    Audiogram 12/1/2023 at [facility] shows a downsloping hearing loss on the left.   Right PTA 7dB, % at 50dB  Left PTA 23dB, WRS 80% at 65dB    Imaging:  We reviewed the MRI from 11/27/2023 that shows a dolicoectactic vertebrobasilar system. There is a large right and diminutive left vetebral artery. The large right vertebral artery courses contralaterally and produces mass effect on the cranial CN7/8 complex and brainstem. It is unclear if the vertebral arteries join or maintain distinct systems. I do not see any enhancing pathology in the IAC or CPA.     Assessment:  Left hearing loss, tinnitus, class A  2.   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 Vertebrobasilar compression as a cause of ipsilateral hearing loss and vestibular dysfunction is a rarer but reported symptom due to mass effect on the 7/8 complex than the more common hemifacial spasm. We also consider this a \"a diagnosis of exclusion\" with an importance of ruling out other causes of asymmetric hearing loss. In his case, it is also possible that noise exposure from his occupation/firearms may be the cause and it is difficult to know for sure if the vessel compression is the " definitive cause.  Vessel ectasia is often linked with hypertension, smoking and uncommon vertebral-basilar anatomy.  We advised good blood pressure control as an attempt to further worsen the ectasia and effect on the CN 7/8 complex.   We advised good blood pressure control, not smoking (he is a nonsmoker), and cholesterol control, diet and exercise optimization. He is working with his PCP on this  We will plan to see him as needed and we encouraged him to call us with any questions moving forward.      It was a pleasure to participate in the care of your patient. Please feel free to contact me at any point if I can be of any assistance for Mr. Duff.    Sincerely,  Seferino Lewis MD       30 minutes spent on the date of the encounter doing chart review, review of outside records, review of test results, interpretation of tests, patient visit, documentation and discussion with other provider(s)        Again, thank you for allowing me to participate in the care of your patient.      Sincerely,    Seferino Lewis MD

## 2023-12-05 NOTE — PATIENT INSTRUCTIONS
You were seen today with Dr. Jessica Hunter and Dr. Seferino Lewis. Your primary contact after today's visit is: DEDE Wolf    Next steps:  Please return to clinic as needed.   A hearing aid clearance form will be provided.       How to Contact Us  Send a [employer] Appt message to your provider.   Call the clinic - your call will be routed appropriately.   ENT Clinic: [phone]   Neurosurgery Clinic: [phone]  To speak directly to an RN Care Coordinator:  Maryann RN: [phone]  Mary RN: [phone]    Note: We do our best to check voicemail frequently throughout the day and make every effort to return calls within 1-2 business days. For urgent matters, please use the general clinic phone numbers listed above.

## 2023-12-05 NOTE — PROGRESS NOTES
AUDIOLOGY REPORT    SUMMARY: Audiology visit completed. See audiogram for results.      RECOMMENDATIONS: Follow-up with ENT.        Michele Chandler, CCC-A  Licensed Audiologist  [place] #[id number]

## 2023-12-05 NOTE — LETTER
[facility]  [facility]SE AND TH[facility]AT CLINIC [address]  [address] FLO[address]  [facility] [address]  Phone: [phone]  Fax: [phone]          2023    RE:   Willam Duff  [address] [facility] [place] [address]      Dear Colleague,    Thank you for referring your patient, Willam Duff, to the [facility]. Please see a copy of my visit note below.      [facility]  Department of Neurosurgery    Name: Willam Duff  MRN: [id number]  : 1965     Reason for visit: hearing loss and dolichoectactic vertebrobasilar ectasia, new patient visit    Dear Dr. Sorensen and Ms. Schmid,    It was a pleasure to see Mr. Wright in the [facility] today.  I have reviewed the referral notes and imaging and have summarized our visit here. As you recall, Mr. Wright is a pleasant 57 year-old right-handed male who presents with left sided hearing loss and tinnitus. After workup by Ms. Schmid, an audiogram was performed showing asymmetric hearing loss and then an MRI was performed showing an ectatic vertebrobasilar system with compression of the cranial nerve 7/8 complex. He is referred for management.    Review of Systems:   Pertinent items are noted in HPI or as in patient entered ROS below, remainder of complete ROS is negative.     Medications: amlodipine, ibuprofen, lisinopril, hydrochlorothiazide, magnesium     Allergies: Patient has no known allergies.      Past Medical History:  arthritis, diverticulitis, hypertension, elevated cholesterol, hip arthroplasty    Family History:  mother and father side with coronary artery disease     Social History: lifetime nonsmoker. He is a  with noise exposure.    Physical Exam:   General: No acute distress.   Head: No signs of trauma.    Eyes:  "Conjunctivae are normal.  Mouth/Throat: Oropharynx moist.  Neck: Normal range of motion.    Resp: No respiratory distress.   MSK: Moves all extremities.  No obvious deformity.  Neuro: The patient is fully oriented and quite pleasant. Speech normal. Extraocular movements are intact without nystagmus. Facial sensation is intact in V1, V2, V3 distributions. Facial nerve function is normal, rated as a House Brackmann 1, without synkinesis.  Palate is symmetric. Shoulders are full strength. Tongue is midline. There is no pronator drift. Full strength in all extremities. Sensation intact throughout. No dysmetria with finger-nose-finger testing.   Psych: Normal mood and affect. Behavior is normal.      Audiogram:  We reviewed the audiogram today which shows:  Right PTA 8 dB,  % at 50 dB  Left PTA 29 dB, WRS 92 % at 80 dB    Audiogram 12/1/2023 at [facility] shows a downsloping hearing loss on the left.   Right PTA 7dB, % at 50dB  Left PTA 23dB, WRS 80% at 65dB    Imaging:  We reviewed the MRI from 11/27/2023 that shows a dolicoectactic vertebrobasilar system. There is a large right and diminutive left vetebral artery. The large right vertebral artery courses contralaterally and produces mass effect on the cranial CN7/8 complex and brainstem. It is unclear if the vertebral arteries join or maintain distinct systems. I do not see any enhancing pathology in the IAC or CPA.     Assessment:  Left hearing loss, tinnitus, class A  2.   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 Vertebrobasilar compression as a cause of ipsilateral hearing loss and vestibular dysfunction is a rarer but reported symptom due to mass effect on the 7/8 complex than the more common hemifacial spasm. We also consider this a \"a diagnosis of exclusion\" with an importance of ruling out other causes of asymmetric hearing loss. In his case, " it is also possible that noise exposure from his occupation/firearms may be the cause and it is difficult to know for sure if the vessel compression is the definitive cause.  Vessel ectasia is often linked with hypertension, smoking and uncommon vertebral-basilar anatomy.  We advised good blood pressure control as an attempt to further worsen the ectasia and effect on the CN 7/8 complex.   We advised good blood pressure control, not smoking (he is a nonsmoker), and cholesterol control, diet and exercise optimization. He is working with his PCP on this  We will plan to see him as needed and we encouraged him to call us with any questions moving forward.      It was a pleasure to participate in the care of your patient. Please feel free to contact me at any point if I can be of any assistance for Mr. Duff.    Sincerely,  Seferino Lewis MD       30 minutes spent on the date of the encounter doing chart review, review of outside records, review of test results, interpretation of tests, patient visit, documentation and discussion with other provider(s)          Again, thank you for allowing me to participate in the care of your patient.      Sincerely,    Seferino Lewis MD

## 2023-12-05 NOTE — LETTER
2023       RE: Willam Duff  [address] [facility] [address]     Dear Colleague,    Thank you for referring your patient, Willam Duff, to the [facility] [place] [facility]. Please see a copy of my visit note below.      [facility]      Name: Willam Duff  MRN: [id number]  Age: 57 year old  : 1965  Referring provider:   Mirlande SURESH   2023     Chief Complaint:   Consultation    History of Present Illness:   Willam Duff is a 57 year old male with a history of asymmetrical hearing loss, left-sided SNHL who was seen in the [facility] for consultation requested by Mirlande SURESH regarding dolichoectasia of the right vertebral artery.  He is seen in conjunction with my colleague in neurosurgery, Dr. Seferino Lewis.    Mr. Duff was found to have left sensorineural hearing loss. MRI was done showing dolichoectasia of the right vertebral artery which is potentially in contact with left CNVII and CNVIII.     He reports changes in hearing over the past 2 years and constant tinnitus in the left ear. Tinnitus onset over 5 yeasrs ago.  The tinnitus is fluctuating and high-pitched in character.  It is not pulsatile.  Of note, he works as a  and has a history of noise exposure due to firearms.     He has noted no vertigo, imbalance, or asymmetry of facial movements.    Review of Systems:   Pertinent items are noted in HPI or as in patient entered ROS below, remainder of complete ROS is negative.       2023     5:38 PM    ENT ROS   Ears, Nose, Throat Hearing loss    Ringing/noise in ears        Active Medications:     Current Outpatient Medications:     amLODIPine (NORVASC) 10 MG tablet, , Disp: , Rfl:     lisinopril (ZESTRIL) 40 MG tablet, Take 1 tablet by mouth daily, Disp: ,  Rfl:       Allergies:   Hydrochlorothiazide      Past Medical History:  Past Medical History:   Diagnosis Date    Arthritis of both hips     Diverticulitis 2015    Elevated glucose     Hypertension 2010    Hypertriglyceridemia     Low HDL (under 40)     Normal cardiac stress test 2018    NMPI     Patient Active Problem List   Diagnosis    Elevated glucose    Hypertension    Hypertriglyceridemia    Low HDL (under 40)    Arthritis of both hips        Past Surgical History:  Past Surgical History:   Procedure Laterality Date    COLONOSCOPY      SURGICAL PATHOLOGY EXAM      ZZC TOTAL HIP ARTHROPLASTY Left 2018       Family History:   Family History   Problem Relation Age of Onset    Coronary Artery Disease Mother 62        MI  first MI    Coronary Artery Disease Father 60        stents    Cerebrovascular Disease Father 70        small strokes. TIA    Gout Father     Pre-Diabetes Brother     Coronary Artery Disease Brother 57        stents    Other - See Comments Maternal Grandmother         unknown cause of death    Other - See Comments Maternal Grandfather         unknown cause of death    Other - See Comments Paternal Grandmother         unknown cause of death    No Known Problems Brother     Other - See Comments Paternal Grandfather         unknown cause of death         Social History:   Social History     Tobacco Use    Smoking status: Never        Physical Exam:   Constitutional:  The patient was accompanied by his wife Raissa, well-groomed, and in no acute distress.     Skin: Normal:  warm and pink without rash   Neurologic: Alert and oriented x 3.  CN's III-XII within normal limits.  Voice normal.    Psychiatric: The patient's affect was calm, cooperative, and appropriate.     Communication:  Normal; communicates verbally, normal voice quality.   Respiratory: Breathing comfortably without stridor or exertion of accessory muscles.    Head/Face:  No lesions or scars. No sinus tenderness.    Salivary  glands -  Normal size, no tenderness, swelling, or palpable masses   Eyes: Pupils were equal and reactive.  Extraocular movement intact.     Ears: Pinnae and tragus non-tender.    EAC's and TM's were clear.            Audiogram:  AUDIOGRAM: He underwent an audiogram 11/14/23. This demonstrated:      Right: Speech reception threshold is 15 dB with 100% word recognition.   Left: Speech reception threshold is 25 dB with 80% word recognition.    Audiogram was independently reviewed    Imaging:  MRI Brain 11/27/23  Impression:   1. Dolichoectasia as well as significant tortuosity of the right vertebral artery which courses into the left cerebellopontine angle cistern causing dorsal and inferior displacement of the left 7th and 8th cranial nerve complex within the cistern. Please see Key images. This could be potentially associated with pulsatile tinnitus as well as left seventh and/or eighth cranial neuropathy.   2. Punctate probable chronic microvascular ischemic foci involving the right frontal lobe subcortical white matter.   3. Mild cerebral and mild cerebellar volume loss.   4. No evidence for acute infarction, abnormal intracranial enhancement, intracranial mass, vestibular schwannoma, abnormal labyrinthine enhancement or mastoiditis.    MRI Brain images were independently reviewed.    Outside records from [facility] were independently reviewed.         Assessment and Plan:  Willam Duff is a 57 year old male with left sensorineural hearing loss who was found to have dolichoectasia of the vertebral artery abutting the 7th and 8th cranial nerves.  He has left hearing loss and tinnitus which may or may not be secondary to this finding. He has no signs or symptoms of facial nerve weakness, vestibular dysfunction, or hydrocephalus.    Claude and his wife asked excellent questions and we had a long discussion about dolichoectasia and asymmetric sensorineural hearing loss.  It is possible that his hearing  loss is unrelated to the dolichoectasia and represents asymmetric noise exposure over the course of his life. There is no evidence of progressive retrocochlear pathologies on imaging.  He will be an excellent candidate for amplification with a hearing aid and he has an appointment scheduled with audiology.  We discussed the natural history of dolichoectasia and that he should continue to take excellent care of cardiovascular risk factors.  We counseled him on reasons to return to clinic, specifically development of hemifacial pain or spasm.  There is no indication to serially image this finding in the absence of evolving symptoms.  Mr. Duff and his wife expressed understanding of these recommendations.    Follow-up: Return precautions discussed.    Luis Oviedo MD  Otolaryngology Head and Neck Surgery Resident, PGY-2    Jessica Hunter MD  Otology & Neurotology  [facility]    I, Jessica Hunter MD, saw this patient with the resident/fellow and agree with the resident's findings and plan of care as documented in the resident's/fellow's note.

## 2023-12-05 NOTE — LETTER
Hearing Aid Medical Clearance    Willam Duff  December 5, 2023        This patient has received a medical examination and may be considered a suitable candidate for a hearing aid.         Physician:__________________________________________________          Dr. Jessica Hunter

## 2024-06-30 ENCOUNTER — HEALTH MAINTENANCE LETTER (OUTPATIENT)
Age: 59
End: 2024-06-30

## 2025-07-13 ENCOUNTER — HEALTH MAINTENANCE LETTER (OUTPATIENT)
Age: 60
End: 2025-07-13